# Patient Record
Sex: MALE | Race: WHITE | NOT HISPANIC OR LATINO | Employment: OTHER | URBAN - METROPOLITAN AREA
[De-identification: names, ages, dates, MRNs, and addresses within clinical notes are randomized per-mention and may not be internally consistent; named-entity substitution may affect disease eponyms.]

---

## 2022-09-02 DIAGNOSIS — G57.92 ISCHEMIC NEUROPATHY OF LEFT FOOT: Primary | ICD-10-CM

## 2022-09-02 RX ORDER — GABAPENTIN 800 MG/1
TABLET ORAL
Qty: 120 TABLET | Refills: 5 | Status: SHIPPED | OUTPATIENT
Start: 2022-09-02 | End: 2023-03-06 | Stop reason: SDUPTHER

## 2022-09-02 NOTE — TELEPHONE ENCOUNTER
Rx Refill Note  Requested Prescriptions     Pending Prescriptions Disp Refills   • gabapentin (NEURONTIN) 800 MG tablet [Pharmacy Med Name: GABAPENTIN 800 MG TABLET] 120 tablet 0     Sig: TAKE ONE TABLET BY MOUTH FOUR TIMES A DAY      Last office visit with prescribing clinician: Visit date not found      Next office visit with prescribing clinician: 9/20/2022            Karuna Calzada MA  09/02/22, 15:01 EDT

## 2022-09-20 ENCOUNTER — OFFICE VISIT (OUTPATIENT)
Dept: FAMILY MEDICINE CLINIC | Facility: CLINIC | Age: 57
End: 2022-09-20

## 2022-09-20 VITALS
WEIGHT: 240 LBS | DIASTOLIC BLOOD PRESSURE: 70 MMHG | SYSTOLIC BLOOD PRESSURE: 134 MMHG | HEIGHT: 69 IN | BODY MASS INDEX: 35.55 KG/M2 | TEMPERATURE: 98.2 F | HEART RATE: 74 BPM | RESPIRATION RATE: 18 BRPM

## 2022-09-20 DIAGNOSIS — F17.218 CIGARETTE NICOTINE DEPENDENCE WITH OTHER NICOTINE-INDUCED DISORDER: ICD-10-CM

## 2022-09-20 DIAGNOSIS — G57.92 ISCHEMIC NEUROPATHY OF FOOT, LEFT: Primary | ICD-10-CM

## 2022-09-20 DIAGNOSIS — Z12.2 SCREENING FOR LUNG CANCER: ICD-10-CM

## 2022-09-20 DIAGNOSIS — Z12.11 COLON CANCER SCREENING: ICD-10-CM

## 2022-09-20 PROBLEM — I25.10 CORONARY ARTERY DISEASE INVOLVING NATIVE CORONARY ARTERY OF NATIVE HEART WITHOUT ANGINA PECTORIS: Status: ACTIVE | Noted: 2022-09-20

## 2022-09-20 PROBLEM — I25.5 ISCHEMIC CARDIOMYOPATHY: Status: ACTIVE | Noted: 2022-09-20

## 2022-09-20 PROCEDURE — 99213 OFFICE O/P EST LOW 20 MIN: CPT | Performed by: FAMILY MEDICINE

## 2022-09-20 RX ORDER — SIMVASTATIN 40 MG
TABLET ORAL
COMMUNITY
Start: 2022-09-02

## 2022-09-20 RX ORDER — CARVEDILOL 12.5 MG/1
12.5 TABLET ORAL 2 TIMES DAILY WITH MEALS
COMMUNITY
Start: 2022-09-02

## 2022-09-20 RX ORDER — RIVAROXABAN 2.5 MG/1
TABLET, FILM COATED ORAL
COMMUNITY
Start: 2022-09-02 | End: 2022-09-20 | Stop reason: SINTOL

## 2022-09-20 RX ORDER — ASPIRIN 81 MG/1
81 TABLET ORAL DAILY
COMMUNITY

## 2022-09-20 RX ORDER — CLOPIDOGREL BISULFATE 75 MG/1
75 TABLET ORAL DAILY
COMMUNITY
Start: 2022-09-02

## 2022-09-20 RX ORDER — SACUBITRIL AND VALSARTAN 49; 51 MG/1; MG/1
1 TABLET, FILM COATED ORAL 2 TIMES DAILY
COMMUNITY
Start: 2022-09-02

## 2022-09-20 NOTE — PROGRESS NOTES
"Chief Complaint   Patient presents with   • Establish Care   • Hyperlipidemia     Needs medication refilled        Subjective      Elton Pardo is a 57 y.o. who presents for ischemic neuropathy of the left foot.  Patient has long history of vascular disease and neuropathic pain is responded well to gabapentin over the years.  Patient follows with cardiology for his ischemic cardiomyopathy and coronary artery disease.  He is due for cardiology follow-up.  Overall he denies changes in health.  He continues to smoke 2 packs of cigarettes per day    The following portions of the patient's history were reviewed and updated as appropriate: allergies, current medications, past family history, past medical history, past social history, past surgical history and problem list.    Review of Systems    Objective   Vital Signs:  /70   Pulse 74   Temp 98.2 °F (36.8 °C)   Resp 18   Ht 175.3 cm (69\")   Wt 109 kg (240 lb)   BMI 35.44 kg/m²         Physical Exam  Constitutional:       Appearance: Normal appearance.   Cardiovascular:      Rate and Rhythm: Normal rate and regular rhythm.      Pulses: Normal pulses.      Heart sounds: Normal heart sounds.   Pulmonary:      Effort: Pulmonary effort is normal.      Breath sounds: Normal breath sounds.   Skin:     General: Skin is warm.   Neurological:      Mental Status: He is alert.          Result Review                     Assessment and Plan  Diagnoses and all orders for this visit:    1. Ischemic neuropathy of foot, left (Primary)    2. Colon cancer screening  -     Ambulatory Referral For Screening Colonoscopy    3. Cigarette nicotine dependence with other nicotine-induced disorder  -      CT Chest Low Dose Cancer Screening WO; Future    4. Screening for lung cancer  -      CT Chest Low Dose Cancer Screening WO; Future    5. BMI 35.0-35.9,adult    MDM:  1Zana Basilio reviewed and compliant.  Patient has been given a refill for gabapentin earlier this month and will " continue this.  Follow-up again in 6 months for annual physical  2.  Patient is overdue for colon cancer screening and believes he had 3 polyps removed approximately 10 years ago during last colonoscopy.  Patient will be referred to Dr. Delbert Gunn at [facility].  3.  Patient has a 90+ pack year history of smoking.  Patient was informed of his candidacy for low-dose CT scan of the chest for lung cancer screening.  Patient is agreeable to this and this will also be arranged at [facility] due to patient proximity.        Follow Up  Return in about 6 months (around 3/20/2023) for Annual.  Patient was given instructions and counseling regarding his condition or for health maintenance advice. Please see specific information pulled into the AVS if appropriate.

## 2022-10-14 ENCOUNTER — HOSPITAL ENCOUNTER (OUTPATIENT)
Age: 57
End: 2022-10-14
Payer: COMMERCIAL

## 2022-10-14 DIAGNOSIS — Z87.891: Primary | ICD-10-CM

## 2022-10-14 DIAGNOSIS — Z12.2: ICD-10-CM

## 2022-10-14 PROCEDURE — 71271 CT THORAX LUNG CANCER SCR C-: CPT

## 2022-12-06 ENCOUNTER — HOSPITAL ENCOUNTER (OUTPATIENT)
Age: 57
Discharge: HOME | End: 2022-12-06
Payer: COMMERCIAL

## 2022-12-06 VITALS
HEART RATE: 72 BPM | OXYGEN SATURATION: 99 % | RESPIRATION RATE: 16 BRPM | DIASTOLIC BLOOD PRESSURE: 85 MMHG | SYSTOLIC BLOOD PRESSURE: 135 MMHG

## 2022-12-06 VITALS
SYSTOLIC BLOOD PRESSURE: 126 MMHG | HEART RATE: 70 BPM | OXYGEN SATURATION: 99 % | DIASTOLIC BLOOD PRESSURE: 84 MMHG | RESPIRATION RATE: 16 BRPM | TEMPERATURE: 97.6 F

## 2022-12-06 VITALS — BODY MASS INDEX: 21.4 KG/M2

## 2022-12-06 VITALS
HEART RATE: 75 BPM | DIASTOLIC BLOOD PRESSURE: 83 MMHG | RESPIRATION RATE: 17 BRPM | SYSTOLIC BLOOD PRESSURE: 166 MMHG | OXYGEN SATURATION: 97 % | TEMPERATURE: 97.8 F

## 2022-12-06 VITALS
RESPIRATION RATE: 20 BRPM | DIASTOLIC BLOOD PRESSURE: 82 MMHG | HEART RATE: 78 BPM | TEMPERATURE: 97.5 F | SYSTOLIC BLOOD PRESSURE: 136 MMHG | OXYGEN SATURATION: 91 %

## 2022-12-06 VITALS
HEART RATE: 71 BPM | RESPIRATION RATE: 18 BRPM | DIASTOLIC BLOOD PRESSURE: 44 MMHG | SYSTOLIC BLOOD PRESSURE: 120 MMHG | OXYGEN SATURATION: 98 %

## 2022-12-06 VITALS — OXYGEN SATURATION: 97 %

## 2022-12-06 DIAGNOSIS — Z12.11: Primary | ICD-10-CM

## 2022-12-06 DIAGNOSIS — D12.5: ICD-10-CM

## 2022-12-06 PROCEDURE — 45380 COLONOSCOPY AND BIOPSY: CPT

## 2022-12-06 PROCEDURE — 0DJD8ZZ INSPECTION OF LOWER INTESTINAL TRACT, VIA NATURAL OR ARTIFICIAL OPENING ENDOSCOPIC: ICD-10-PCS | Performed by: SURGERY

## 2022-12-06 PROCEDURE — 45385 COLONOSCOPY W/LESION REMOVAL: CPT

## 2023-03-06 DIAGNOSIS — G57.92 ISCHEMIC NEUROPATHY OF LEFT FOOT: ICD-10-CM

## 2023-03-06 RX ORDER — GABAPENTIN 800 MG/1
800 TABLET ORAL 4 TIMES DAILY
Qty: 120 TABLET | Refills: 5 | Status: SHIPPED | OUTPATIENT
Start: 2023-03-06

## 2023-03-06 NOTE — TELEPHONE ENCOUNTER
Incoming Refill Request      Medication requested (name and dose): gabapentin (NEURONTIN) 800 MG tablet    Pharmacy where request should be sent: [facility]    Additional details provided by patient: PT IS OUT OF MEDICATION AND HAS AN UPCOMING APPT ON 03/20    Best call back number:ON FILE    Does the patient have less than a 3 day supply:  [x] Yes  [] No    Amparo Long  03/06/23, 09:00 EST

## 2023-03-20 ENCOUNTER — OFFICE VISIT (OUTPATIENT)
Dept: FAMILY MEDICINE CLINIC | Facility: CLINIC | Age: 58
End: 2023-03-20
Payer: COMMERCIAL

## 2023-03-20 VITALS
SYSTOLIC BLOOD PRESSURE: 150 MMHG | OXYGEN SATURATION: 97 % | DIASTOLIC BLOOD PRESSURE: 80 MMHG | WEIGHT: 242.8 LBS | HEIGHT: 69 IN | TEMPERATURE: 97.3 F | BODY MASS INDEX: 35.96 KG/M2 | RESPIRATION RATE: 20 BRPM | HEART RATE: 70 BPM

## 2023-03-20 DIAGNOSIS — G57.92 ISCHEMIC NEUROPATHY OF FOOT, LEFT: Primary | ICD-10-CM

## 2023-03-20 DIAGNOSIS — L30.1 DYSHIDROTIC ECZEMA: ICD-10-CM

## 2023-03-20 PROCEDURE — 96372 THER/PROPH/DIAG INJ SC/IM: CPT | Performed by: FAMILY MEDICINE

## 2023-03-20 PROCEDURE — 99213 OFFICE O/P EST LOW 20 MIN: CPT | Performed by: FAMILY MEDICINE

## 2023-03-20 RX ORDER — TRIAMCINOLONE ACETONIDE 40 MG/ML
40 INJECTION, SUSPENSION INTRA-ARTICULAR; INTRAMUSCULAR ONCE
Status: COMPLETED | OUTPATIENT
Start: 2023-03-20 | End: 2023-03-20

## 2023-03-20 RX ORDER — PREDNISONE 20 MG/1
TABLET ORAL
Qty: 15 TABLET | Refills: 0 | Status: SHIPPED | OUTPATIENT
Start: 2023-03-20

## 2023-03-20 RX ADMIN — TRIAMCINOLONE ACETONIDE 40 MG: 40 INJECTION, SUSPENSION INTRA-ARTICULAR; INTRAMUSCULAR at 15:40

## 2023-03-20 NOTE — PROGRESS NOTES
"Chief Complaint   Patient presents with   • Follow-up   • Coronary Artery Disease   • ischemic neuropathy L foot        Subjective      Elton Pardo is a 57 y.o. who presents for maintenance visit of his ischemic neuropathy of the left foot.  He reports pain remains well controlled with gabapentin 800 mg 4 times daily.  He is not in need of a refill today.    Patient has a new complaint of pruritic rash that develops on the hands.  Rash develops in colder weather.  Rash has been happening over the lifespan.  It is responded best to steroids    Objective   Vital Signs:  /80   Pulse 70   Temp 97.3 °F (36.3 °C)   Resp 20   Ht 175.3 cm (69.02\")   Wt 110 kg (242 lb 12.8 oz)   SpO2 97%   BMI 35.84 kg/m²     Physical Exam  Vitals reviewed.   Constitutional:       Appearance: Normal appearance.   Cardiovascular:      Rate and Rhythm: Normal rate and regular rhythm.      Pulses: Normal pulses.      Heart sounds: Normal heart sounds.   Pulmonary:      Effort: Pulmonary effort is normal.      Breath sounds: Normal breath sounds.   Skin:     Comments: Erythematous patches on the palms of the hands and on the right middle finger   Neurological:      Mental Status: He is alert.          Result Review                     Assessment and Plan  Diagnoses and all orders for this visit:    1. Ischemic neuropathy of foot, left (Primary)  Comments:  Stable.  Continue gabapentin.  RTO 6 months    2. Dyshidrotic eczema  Comments:  Given IM triamcinolone.  Complete 10-day prednisone taper.  Continue moisturizer  Orders:  -     triamcinolone acetonide (KENALOG-40) injection 40 mg  -     predniSONE (DELTASONE) 20 MG tablet; 2 tablets daily for 5 days then 1 tablet daily for 5 days  Dispense: 15 tablet; Refill: 0            Follow Up  Return in about 6 months (around 9/20/2023) for Annual.  Patient was given instructions and counseling regarding his condition or for health maintenance advice. Please see specific information " pulled into the AVS if appropriate.

## 2023-09-06 DIAGNOSIS — G57.92 ISCHEMIC NEUROPATHY OF LEFT FOOT: ICD-10-CM

## 2023-09-06 RX ORDER — GABAPENTIN 800 MG/1
TABLET ORAL
Qty: 120 TABLET | Refills: 5 | Status: SHIPPED | OUTPATIENT
Start: 2023-09-06

## 2023-09-06 NOTE — TELEPHONE ENCOUNTER
Rx Refill Note  Requested Prescriptions     Pending Prescriptions Disp Refills    gabapentin (NEURONTIN) 800 MG tablet [Pharmacy Med Name: GABAPENTIN 800 MG TABLET] 120 tablet 0     Sig: TAKE ONE TABLET BY MOUTH FOUR TIMESA DAY      Last office visit with prescribing clinician: 3/20/2023   Last telemedicine visit with prescribing clinician: Visit date not found   Next office visit with prescribing clinician: 9/22/2023                         Would you like a call back once the refill request has been completed: [] Yes [] No    If the office needs to give you a call back, can they leave a voicemail: [] Yes [] No    Karuna Calzada MA  09/06/23, 13:46 EDT

## 2023-09-22 ENCOUNTER — OFFICE VISIT (OUTPATIENT)
Dept: FAMILY MEDICINE CLINIC | Facility: CLINIC | Age: 58
End: 2023-09-22
Payer: COMMERCIAL

## 2023-09-22 VITALS
HEIGHT: 69 IN | TEMPERATURE: 97.8 F | RESPIRATION RATE: 16 BRPM | HEART RATE: 74 BPM | BODY MASS INDEX: 33.95 KG/M2 | DIASTOLIC BLOOD PRESSURE: 88 MMHG | WEIGHT: 229.2 LBS | SYSTOLIC BLOOD PRESSURE: 140 MMHG

## 2023-09-22 DIAGNOSIS — G57.92 ISCHEMIC NEUROPATHY OF FOOT, LEFT: ICD-10-CM

## 2023-09-22 DIAGNOSIS — Z00.00 ANNUAL PHYSICAL EXAM: Primary | ICD-10-CM

## 2023-09-22 DIAGNOSIS — F17.218 CIGARETTE NICOTINE DEPENDENCE WITH OTHER NICOTINE-INDUCED DISORDER: ICD-10-CM

## 2023-09-22 DIAGNOSIS — G57.92 ISCHEMIC NEUROPATHY OF LEFT FOOT: ICD-10-CM

## 2023-09-22 DIAGNOSIS — I25.10 CORONARY ARTERY DISEASE INVOLVING NATIVE CORONARY ARTERY OF NATIVE HEART WITHOUT ANGINA PECTORIS: ICD-10-CM

## 2023-09-22 DIAGNOSIS — Z12.2 SCREENING FOR LUNG CANCER: ICD-10-CM

## 2023-09-22 DIAGNOSIS — Z23 NEED FOR INFLUENZA VACCINATION: ICD-10-CM

## 2023-09-22 RX ORDER — GABAPENTIN 800 MG/1
800 TABLET ORAL 4 TIMES DAILY
Qty: 120 TABLET | Refills: 5
Start: 2023-09-22

## 2023-09-22 NOTE — PROGRESS NOTES
"Chief Complaint   Patient presents with    Annual Exam    Flu Vaccine       Subjective      Elton Pardo is a 58 y.o. who presents for annual physical. He denies changes in health.  He has cardiology follow-up next week for his ischemic cardiomyopathy.  He feels like he is doing well.  He has lost weight and continues to increase his activity level which is occasionally limited by the ischemic neuropathy in the left leg.    The following portions of the patient's history were reviewed and updated as appropriate: allergies, current medications, past family history, past medical history, past social history, past surgical history, and problem list.    Review of Systems   All other systems reviewed and are negative.    Objective   Vital Signs:  /88   Pulse 74   Temp 97.8 °F (36.6 °C)   Resp 16   Ht 175.3 cm (69.02\")   Wt 104 kg (229 lb 3.2 oz)   BMI 33.83 kg/m²     BMI is >= 30 and <35. (Class 1 Obesity). The following options were offered after discussion;: exercise counseling/recommendations and nutrition counseling/recommendations        Physical Exam  Constitutional:       General: He is not in acute distress.     Appearance: Normal appearance. He is not ill-appearing.   HENT:      Head: Normocephalic and atraumatic.      Right Ear: Tympanic membrane and ear canal normal.      Left Ear: Tympanic membrane and ear canal normal.      Nose: Nose normal.      Mouth/Throat:      Mouth: Mucous membranes are dry.      Pharynx: Oropharynx is clear. No posterior oropharyngeal erythema.   Eyes:      Extraocular Movements: Extraocular movements intact.      Conjunctiva/sclera: Conjunctivae normal.      Pupils: Pupils are equal, round, and reactive to light.   Cardiovascular:      Rate and Rhythm: Normal rate and regular rhythm.      Pulses: Normal pulses.      Heart sounds: Normal heart sounds.   Pulmonary:      Effort: Pulmonary effort is normal. No respiratory distress.      Breath sounds: Normal breath sounds. "   Abdominal:      General: Abdomen is flat. Bowel sounds are normal.      Palpations: Abdomen is soft.      Tenderness: There is no abdominal tenderness.   Musculoskeletal:         General: Normal range of motion.      Cervical back: Normal range of motion and neck supple.   Lymphadenopathy:      Cervical: No cervical adenopathy.   Skin:     General: Skin is warm and dry.      Capillary Refill: Capillary refill takes less than 2 seconds.   Neurological:      General: No focal deficit present.      Mental Status: He is alert and oriented to person, place, and time. Mental status is at baseline.      Cranial Nerves: No cranial nerve deficit.      Sensory: No sensory deficit.      Motor: No weakness.   Psychiatric:         Mood and Affect: Mood normal.         Behavior: Behavior normal.         Thought Content: Thought content normal.         Judgment: Judgment normal.        Result Review                     Assessment and Plan  Diagnoses and all orders for this visit:    1. Annual physical exam (Primary)    2. Need for influenza vaccination  -     Fluzone >6 Months ([id number])    3. Ischemic neuropathy of foot, left    4. Ischemic neuropathy of left foot  -     gabapentin (NEURONTIN) 800 MG tablet; Take 1 tablet by mouth 4 (Four) Times a Day.  Dispense: 120 tablet; Refill: 5    5. Coronary artery disease involving native coronary artery of native heart without angina pectoris  -     CBC & Differential  -     Comprehensive Metabolic Panel  -     Lipid Panel    6. Cigarette nicotine dependence with other nicotine-induced disorder  -      CT Chest Low Dose Cancer Screening WO; Future    7. Screening for lung cancer  -      CT Chest Low Dose Cancer Screening WO; Future    Plan  1.  Congratulated patient on the positive lifestyle changes he has made and encouraged him to continue to increase his activity level.  2.  Recommended smoking cessation.  Patient is not ready to stop although recognizes the harms of continued  cigarette smoking  3.  Low-dose CT scan of the chest will be arranged for lung cancer screening  4.  Surveillance labs ordered today  5.  Flu shot given today.  Recommend patient also get shingles vaccination        Follow Up  No follow-ups on file.  Patient was given instructions and counseling regarding his condition or for health maintenance advice. Please see specific information pulled into the AVS if appropriate.

## 2023-09-23 LAB
ALBUMIN SERPL-MCNC: 4.3 G/DL (ref 3.8–4.9)
ALBUMIN/GLOB SERPL: 1.7 {RATIO} (ref 1.2–2.2)
ALP SERPL-CCNC: 70 IU/L (ref 44–121)
ALT SERPL-CCNC: 8 IU/L (ref 0–44)
AST SERPL-CCNC: 11 IU/L (ref 0–40)
BASOPHILS # BLD AUTO: 0.1 X10E3/UL (ref 0–0.2)
BASOPHILS NFR BLD AUTO: 1 %
BILIRUB SERPL-MCNC: 0.7 MG/DL (ref 0–1.2)
BUN SERPL-MCNC: 6 MG/DL (ref 6–24)
BUN/CREAT SERPL: 9 (ref 9–20)
CALCIUM SERPL-MCNC: 9.4 MG/DL (ref 8.7–10.2)
CHLORIDE SERPL-SCNC: 99 MMOL/L (ref 96–106)
CHOLEST SERPL-MCNC: 229 MG/DL (ref 100–199)
CO2 SERPL-SCNC: 25 MMOL/L (ref 20–29)
CREAT SERPL-MCNC: 0.68 MG/DL (ref 0.76–1.27)
EGFRCR SERPLBLD CKD-EPI 2021: 108 ML/MIN/1.73
EOSINOPHIL # BLD AUTO: 0.2 X10E3/UL (ref 0–0.4)
EOSINOPHIL NFR BLD AUTO: 3 %
ERYTHROCYTE [DISTWIDTH] IN BLOOD BY AUTOMATED COUNT: 13.4 % (ref 11.6–15.4)
GLOBULIN SER CALC-MCNC: 2.5 G/DL (ref 1.5–4.5)
GLUCOSE SERPL-MCNC: 94 MG/DL (ref 70–99)
HCT VFR BLD AUTO: 46.8 % (ref 37.5–51)
HDLC SERPL-MCNC: 29 MG/DL
HGB BLD-MCNC: 16.3 G/DL (ref 13–17.7)
IMM GRANULOCYTES # BLD AUTO: 0.1 X10E3/UL (ref 0–0.1)
IMM GRANULOCYTES NFR BLD AUTO: 1 %
LDLC SERPL CALC-MCNC: 143 MG/DL (ref 0–99)
LYMPHOCYTES # BLD AUTO: 2.1 X10E3/UL (ref 0.7–3.1)
LYMPHOCYTES NFR BLD AUTO: 22 %
MCH RBC QN AUTO: 34.8 PG (ref 26.6–33)
MCHC RBC AUTO-ENTMCNC: 34.8 G/DL (ref 31.5–35.7)
MCV RBC AUTO: 100 FL (ref 79–97)
MONOCYTES # BLD AUTO: 0.6 X10E3/UL (ref 0.1–0.9)
MONOCYTES NFR BLD AUTO: 6 %
NEUTROPHILS # BLD AUTO: 6.6 X10E3/UL (ref 1.4–7)
NEUTROPHILS NFR BLD AUTO: 67 %
PLATELET # BLD AUTO: 254 X10E3/UL (ref 150–450)
POTASSIUM SERPL-SCNC: 4.5 MMOL/L (ref 3.5–5.2)
PROT SERPL-MCNC: 6.8 G/DL (ref 6–8.5)
RBC # BLD AUTO: 4.69 X10E6/UL (ref 4.14–5.8)
SODIUM SERPL-SCNC: 138 MMOL/L (ref 134–144)
TRIGL SERPL-MCNC: 308 MG/DL (ref 0–149)
VLDLC SERPL CALC-MCNC: 57 MG/DL (ref 5–40)
WBC # BLD AUTO: 9.7 X10E3/UL (ref 3.4–10.8)

## 2023-09-25 DIAGNOSIS — I25.10 CORONARY ARTERY DISEASE INVOLVING NATIVE CORONARY ARTERY OF NATIVE HEART WITHOUT ANGINA PECTORIS: Primary | ICD-10-CM

## 2023-09-25 RX ORDER — ROSUVASTATIN CALCIUM 40 MG/1
40 TABLET, COATED ORAL DAILY
Qty: 90 TABLET | Refills: 1 | Status: SHIPPED | OUTPATIENT
Start: 2023-09-25

## 2023-10-13 ENCOUNTER — HOSPITAL ENCOUNTER (OUTPATIENT)
Age: 58
End: 2023-10-13
Payer: COMMERCIAL

## 2023-10-13 DIAGNOSIS — F17.218 CIGARETTE NICOTINE DEPENDENCE WITH OTHER NICOTINE-INDUCED DISORDER: ICD-10-CM

## 2023-10-13 DIAGNOSIS — F17.218: ICD-10-CM

## 2023-10-13 DIAGNOSIS — Z12.2: Primary | ICD-10-CM

## 2023-10-13 DIAGNOSIS — Z12.2 SCREENING FOR LUNG CANCER: ICD-10-CM

## 2023-10-13 PROCEDURE — 71271 CT THORAX LUNG CANCER SCR C-: CPT

## 2024-03-05 DIAGNOSIS — G57.92 ISCHEMIC NEUROPATHY OF LEFT FOOT: ICD-10-CM

## 2024-03-05 RX ORDER — GABAPENTIN 800 MG/1
800 TABLET ORAL 4 TIMES DAILY
Qty: 120 TABLET | Refills: 5 | Status: SHIPPED | OUTPATIENT
Start: 2024-03-05

## 2024-03-05 NOTE — TELEPHONE ENCOUNTER
Caller: Elton Pardo    Relationship: Self    Best call back number: [phone]    Requested Prescriptions:   Requested Prescriptions     Pending Prescriptions Disp Refills    gabapentin (NEURONTIN) 800 MG tablet       Sig: Take 1 tablet by mouth 4 (Four) Times a Day.        Pharmacy where request should be sent:  [facility] - [address] - [phone]  - [phone]     Last office visit with prescribing clinician: 9/22/2023   Last telemedicine visit with prescribing clinician: Visit date not found   Next office visit with prescribing clinician: Visit date not found     Additional details provided by patient:     Does the patient have less than a 3 day supply:  [x] Yes  [] No    Would you like a call back once the refill request has been completed: [x] Yes [] No    If the office needs to give you a call back, can they leave a voicemail: [x] Yes [] No    Zeynep Rolle Rep   03/05/24 12:48 EST

## 2024-08-29 DIAGNOSIS — G57.92 ISCHEMIC NEUROPATHY OF LEFT FOOT: ICD-10-CM

## 2024-08-29 RX ORDER — GABAPENTIN 800 MG/1
800 TABLET ORAL 4 TIMES DAILY
Qty: 120 TABLET | Refills: 0 | Status: SHIPPED | OUTPATIENT
Start: 2024-08-29

## 2024-08-29 NOTE — TELEPHONE ENCOUNTER
Caller: Elton Pardo    Relationship: Self    Best call back number: [phone]     Requested Prescriptions:   Requested Prescriptions     Pending Prescriptions Disp Refills    gabapentin (NEURONTIN) 800 MG tablet 120 tablet 5     Sig: Take 1 tablet by mouth 4 (Four) Times a Day.        Pharmacy where request should be sent: [facility] - [address] - [phone] PH - [phone] FX     Last office visit with prescribing clinician: 9/22/2023   Last telemedicine visit with prescribing clinician: Visit date not found   Next office visit with prescribing clinician: Visit date not found     Additional details provided by patient:     Does the patient have less than a 3 day supply:  [] Yes  [x] No    Would you like a call back once the refill request has been completed: [] Yes [x] No    If the office needs to give you a call back, can they leave a voicemail: [] Yes [x] No    Zeynep Mason Rep   08/29/24 14:01 EDT

## 2024-10-11 DIAGNOSIS — G57.92 ISCHEMIC NEUROPATHY OF LEFT FOOT: ICD-10-CM

## 2024-10-11 RX ORDER — GABAPENTIN 800 MG/1
TABLET ORAL
Qty: 120 TABLET | Refills: 0 | Status: SHIPPED | OUTPATIENT
Start: 2024-10-11

## 2024-10-11 NOTE — TELEPHONE ENCOUNTER
A 1 month prescription has been sent to the patient's pharmacy.  He is overdue for an appointment and will need to be seen for additional refills

## 2024-10-22 ENCOUNTER — OFFICE VISIT (OUTPATIENT)
Dept: FAMILY MEDICINE CLINIC | Facility: CLINIC | Age: 59
End: 2024-10-22
Payer: COMMERCIAL

## 2024-10-22 VITALS
SYSTOLIC BLOOD PRESSURE: 134 MMHG | WEIGHT: 220 LBS | DIASTOLIC BLOOD PRESSURE: 76 MMHG | HEART RATE: 72 BPM | OXYGEN SATURATION: 97 % | RESPIRATION RATE: 18 BRPM | HEIGHT: 69 IN | BODY MASS INDEX: 32.58 KG/M2 | TEMPERATURE: 97.8 F

## 2024-10-22 DIAGNOSIS — Z00.00 ANNUAL PHYSICAL EXAM: Primary | ICD-10-CM

## 2024-10-22 DIAGNOSIS — G57.92 ISCHEMIC NEUROPATHY OF LEFT FOOT: ICD-10-CM

## 2024-10-22 DIAGNOSIS — J44.9 CHRONIC OBSTRUCTIVE PULMONARY DISEASE, UNSPECIFIED COPD TYPE: ICD-10-CM

## 2024-10-22 DIAGNOSIS — Z12.2 ENCOUNTER FOR SCREENING FOR LUNG CANCER: ICD-10-CM

## 2024-10-22 DIAGNOSIS — Z87.891 PERSONAL HISTORY OF NICOTINE DEPENDENCE: ICD-10-CM

## 2024-10-22 PROBLEM — E66.811 OBESITY (BMI 30.0-34.9): Status: ACTIVE | Noted: 2024-10-22

## 2024-10-22 PROCEDURE — 1159F MED LIST DOCD IN RCRD: CPT | Performed by: FAMILY MEDICINE

## 2024-10-22 PROCEDURE — 1160F RVW MEDS BY RX/DR IN RCRD: CPT | Performed by: FAMILY MEDICINE

## 2024-10-22 PROCEDURE — 90656 IIV3 VACC NO PRSV 0.5 ML IM: CPT | Performed by: FAMILY MEDICINE

## 2024-10-22 PROCEDURE — 99396 PREV VISIT EST AGE 40-64: CPT | Performed by: FAMILY MEDICINE

## 2024-10-22 PROCEDURE — 90471 IMMUNIZATION ADMIN: CPT | Performed by: FAMILY MEDICINE

## 2024-10-22 RX ORDER — GABAPENTIN 800 MG/1
800 TABLET ORAL 4 TIMES DAILY
Qty: 120 TABLET | Refills: 5 | Status: SHIPPED | OUTPATIENT
Start: 2024-10-22

## 2024-10-22 RX ORDER — SIMVASTATIN 40 MG
40 TABLET ORAL DAILY
COMMUNITY
Start: 2024-05-06

## 2024-10-22 RX ORDER — UMECLIDINIUM BROMIDE AND VILANTEROL TRIFENATATE 62.5; 25 UG/1; UG/1
1 POWDER RESPIRATORY (INHALATION)
Qty: 60 EACH | Refills: 11 | Status: SHIPPED | OUTPATIENT
Start: 2024-10-22

## 2024-10-22 NOTE — PROGRESS NOTES
"Chief Complaint   Patient presents with    Med Refill       Subjective      Elton Pardo is a 59 y.o. who presents for yearly physical.    Sleep.  6 to 8 hours per night.    Diet.  Patient limits sodium and has tried to limit red meat in the diet.    Physical activity.  Mostly through ADLs.  No formal exercise.    Patient continues to smoke and is actually smoking more cigarettes now than he was a year ago after the death of a family member.    The following portions of the patient's history were reviewed and updated as appropriate: allergies, current medications, past family history, past medical history, past social history, past surgical history, and problem list.    Review of Systems   Respiratory:  Positive for cough and wheezing.    All other systems reviewed and are negative.      Objective   Vital Signs:  /76   Pulse 72   Temp 97.8 °F (36.6 °C)   Resp 18   Ht 175.3 cm (69\")   Wt 99.8 kg (220 lb)   SpO2 97%   BMI 32.49 kg/m²     BMI is >= 30 and <35. (Class 1 Obesity). The following options were offered after discussion;: Information on healthy weight added to patient's after visit summary.        Physical Exam  Constitutional:       General: He is not in acute distress.     Appearance: Normal appearance. He is not ill-appearing.   HENT:      Head: Normocephalic and atraumatic.      Right Ear: Tympanic membrane and ear canal normal.      Left Ear: Tympanic membrane and ear canal normal.      Nose: Nose normal.      Mouth/Throat:      Mouth: Mucous membranes are moist.      Pharynx: Oropharynx is clear. No posterior oropharyngeal erythema.   Eyes:      Extraocular Movements: Extraocular movements intact.      Conjunctiva/sclera: Conjunctivae normal.      Pupils: Pupils are equal, round, and reactive to light.   Cardiovascular:      Rate and Rhythm: Normal rate and regular rhythm.      Pulses: Normal pulses.      Heart sounds: Normal heart sounds.   Pulmonary:      Effort: Pulmonary effort is " normal. No respiratory distress.      Breath sounds: Wheezing present.   Abdominal:      General: Abdomen is flat. Bowel sounds are normal.      Palpations: Abdomen is soft.      Tenderness: There is no abdominal tenderness.   Musculoskeletal:         General: Normal range of motion.      Cervical back: Normal range of motion and neck supple.   Lymphadenopathy:      Cervical: No cervical adenopathy.   Skin:     General: Skin is warm and dry.      Capillary Refill: Capillary refill takes less than 2 seconds.   Neurological:      General: No focal deficit present.      Mental Status: He is alert and oriented to person, place, and time. Mental status is at baseline.      Cranial Nerves: No cranial nerve deficit.      Sensory: No sensory deficit.      Motor: No weakness.   Psychiatric:         Mood and Affect: Mood normal.         Behavior: Behavior normal.         Thought Content: Thought content normal.         Judgment: Judgment normal.          Result Review                     Assessment and Plan  Diagnoses and all orders for this visit:    1. Annual physical exam (Primary)    2. Ischemic neuropathy of left foot  -     gabapentin (NEURONTIN) 800 MG tablet; Take 1 tablet by mouth 4 (Four) Times a Day.  Dispense: 120 tablet; Refill: 5    3. Chronic obstructive pulmonary disease, unspecified COPD type  -     Umeclidinium-Vilanterol (Anoro Ellipta) 62.5-25 MCG/ACT aerosol powder  inhaler; Inhale 1 puff Daily.  Dispense: 60 each; Refill: 11    4. Encounter for screening for lung cancer    5. Personal history of nicotine dependence    Other orders  -     Fluzone >6mos ([id number])    Plan  1.  Patient's physical health is stable.  He is aware of the benefits of smoking cessation and will continue to try to reduce cigarette usage.  He declines nicotine replacement therapies.  2.  Flu vaccine given today  3.  CT scan of the chest will be arranged for lung cancer screening  4.  Patient may be due for colorectal cancer  screening.  We will obtain pathology reports from his last colonoscopy in 2022.  If due he will be referred to Dr. Brady Rose at [facility]  5.  Patient has findings consistent with COPD and after discussion of treatment options he will be started on Anoro.  6.  Patient will follow-up with his cardiologist Dr. Desir as scheduled next month.        Follow Up  Return in about 1 year (around 10/22/2025) for Annual.  Patient was given instructions and counseling regarding his condition or for health maintenance advice. Please see specific information pulled into the AVS if appropriate.

## 2025-04-03 ENCOUNTER — TELEPHONE (OUTPATIENT)
Dept: FAMILY MEDICINE CLINIC | Facility: CLINIC | Age: 60
End: 2025-04-03
Payer: COMMERCIAL

## 2025-04-03 NOTE — TELEPHONE ENCOUNTER
Patient's wife initially called and stated her  is having chest pain (initially described as it feels like something is sitting on his chest), shortness of breath, and coughing up blood. After speaking with patient directly, patient advised he is having all of these symptoms and stated they have been going on for about a month. Patient states he recently took an antibiotic for unrelated medical issue. Patient advised to go to ED and patient verbalized understanding. Patient denied any other symptoms.

## 2025-04-14 ENCOUNTER — OFFICE VISIT (OUTPATIENT)
Dept: FAMILY MEDICINE CLINIC | Facility: CLINIC | Age: 60
End: 2025-04-14
Payer: COMMERCIAL

## 2025-04-14 ENCOUNTER — HOSPITAL ENCOUNTER (OUTPATIENT)
Dept: GENERAL RADIOLOGY | Facility: HOSPITAL | Age: 60
Discharge: HOME | End: 2025-04-14
Admitting: FAMILY MEDICINE
Payer: COMMERCIAL

## 2025-04-14 VITALS
HEART RATE: 69 BPM | DIASTOLIC BLOOD PRESSURE: 70 MMHG | HEIGHT: 69 IN | SYSTOLIC BLOOD PRESSURE: 138 MMHG | TEMPERATURE: 98.2 F | OXYGEN SATURATION: 97 % | WEIGHT: 220.8 LBS | BODY MASS INDEX: 32.7 KG/M2 | RESPIRATION RATE: 20 BRPM

## 2025-04-14 DIAGNOSIS — J18.9 PNEUMONIA OF LEFT UPPER LOBE DUE TO INFECTIOUS ORGANISM: ICD-10-CM

## 2025-04-14 DIAGNOSIS — Z87.891 PERSONAL HISTORY OF NICOTINE DEPENDENCE: ICD-10-CM

## 2025-04-14 DIAGNOSIS — Z12.2 ENCOUNTER FOR SCREENING FOR LUNG CANCER: ICD-10-CM

## 2025-04-14 DIAGNOSIS — J18.9 PNEUMONIA OF LEFT UPPER LOBE DUE TO INFECTIOUS ORGANISM: Primary | ICD-10-CM

## 2025-04-14 PROCEDURE — 99214 OFFICE O/P EST MOD 30 MIN: CPT | Performed by: FAMILY MEDICINE

## 2025-04-14 PROCEDURE — 71046 X-RAY EXAM CHEST 2 VIEWS: CPT

## 2025-04-14 NOTE — PROGRESS NOTES
"Chief Complaint   Patient presents with    FU from [facility] ER / pneumonia        Subjective      Elton Pardo is a 59 y.o. who presents for follow-up of an emergency room visit from April 3 at [facility] when he presented with complaint of several months of sensation as if he was breathing through a sponge.  He had developed cough and even hemoptysis.  ER evaluation revealed mild leukocytosis along with abnormal chest x-ray showing a left upper lobe opacity and subsequent CTA of the chest showing the opacity along with lobulated appearing mass in the left perihilar region of unknown significance considered to possibly be malignancy versus group of lymph nodes.  Patient denies ever having fevers, chills, night sweats.  He is a longtime cigarette smoker.  Last low-dose CT scan of the chest was in October 2023 and there were no abnormalities in the left lung, only nodules on the right which were not suspicious.  Patient was treated with doxycycline and reports improvement in symptoms, specifically shortness of breath.  Cough is now absent.  He also has underlying COPD and tells me today that he cannot tolerate the Anoro any longer as it triggers significant cough with each use.    The following portions of the patient's history were reviewed and updated as appropriate: allergies, current medications, past family history, past medical history, past social history, past surgical history, and problem list.    Review of Systems    Objective   Vital Signs:  /70   Pulse 69   Temp 98.2 °F (36.8 °C)   Resp 20   Ht 175.3 cm (69\")   Wt 100 kg (220 lb 12.8 oz)   SpO2 97%   BMI 32.61 kg/m²               Physical Exam  Vitals reviewed.   Constitutional:       Appearance: Normal appearance.   HENT:      Head: Normocephalic and atraumatic.      Right Ear: Tympanic membrane and ear canal normal.      Left Ear: Tympanic membrane and ear canal normal.      Nose: Nose normal.      Mouth/Throat:      " Mouth: Mucous membranes are moist.      Pharynx: Oropharynx is clear.   Eyes:      Conjunctiva/sclera: Conjunctivae normal.   Cardiovascular:      Rate and Rhythm: Normal rate and regular rhythm.      Heart sounds: Normal heart sounds. No murmur heard.  Pulmonary:      Effort: Pulmonary effort is normal. No respiratory distress.      Breath sounds: Examination of the left-upper field reveals rhonchi. Examination of the left-middle field reveals rhonchi. Rhonchi present.   Musculoskeletal:      Cervical back: Normal range of motion and neck supple. No tenderness.   Lymphadenopathy:      Cervical: No cervical adenopathy.   Skin:     General: Skin is warm and dry.   Neurological:      Mental Status: He is alert.   Psychiatric:         Mood and Affect: Mood normal.          Result Review   The following data was reviewed by: Tobi Grijalva MD on 04/14/2025:    Data reviewed : Radiologic studies chest x-ray and CTA chest April 3, 2025 and ER note along with labs 4/3/2025               Assessment and Plan  Diagnoses and all orders for this visit:    1. Pneumonia of left upper lobe due to infectious organism (Primary)  -     XR Chest PA & Lateral; Future    2. Encounter for screening for lung cancer  -      CT Chest Low Dose Cancer Screening WO; Future    3. Personal history of nicotine dependence  -      CT Chest Low Dose Cancer Screening WO; Future    Plan: New problem with uncertain diagnosis prognosis.  Discussion was had with the patient and his wife regarding the workup.  I have expressed some optimism over the fact that he is feeling better after a course of antibiotics which would support a diagnosis of pneumonia as the cause of his symptoms.  Lung exam remains mildly abnormal.  He will have a repeat chest x-ray today to assess for improvement in left upper lobe opacity and left perihilar fullness.  We will proceed with repeat low-dose CT scan of the chest to be performed in early May as another form of  follow-up and for lung cancer screening.        Follow Up  No follow-ups on file.  Patient was given instructions and counseling regarding his condition or for health maintenance advice. Please see specific information pulled into the AVS if appropriate.

## 2025-05-01 DIAGNOSIS — Z12.2 ENCOUNTER FOR SCREENING FOR LUNG CANCER: ICD-10-CM

## 2025-05-01 DIAGNOSIS — G57.92 ISCHEMIC NEUROPATHY OF LEFT FOOT: ICD-10-CM

## 2025-05-01 DIAGNOSIS — Z87.891 PERSONAL HISTORY OF NICOTINE DEPENDENCE: ICD-10-CM

## 2025-05-01 RX ORDER — GABAPENTIN 800 MG/1
800 TABLET ORAL EVERY 6 HOURS
Qty: 120 TABLET | Refills: 5 | Status: SHIPPED | OUTPATIENT
Start: 2025-05-01

## 2025-05-08 ENCOUNTER — TELEPHONE (OUTPATIENT)
Dept: FAMILY MEDICINE CLINIC | Facility: CLINIC | Age: 60
End: 2025-05-08

## 2025-05-08 NOTE — TELEPHONE ENCOUNTER
Caller: DAVIN STONE    Relationship: Other    Best call back number: [phone]   CASE NUMBER: [id number]    What was the call regarding: [facility] STONE IS CALLING AND WOULD LIKE DR. CRAWFORD TO BE AWARE THAT THEY HAVE APPROVED THE PATIENTS ORDER FOR A CT SCAN OF THE CHEST WITH CONTRAST. THE AUTH NUMBER FOR THIS IS [id number].

## 2025-05-29 DIAGNOSIS — R91.8 MASS OF UPPER LOBE OF LEFT LUNG: ICD-10-CM

## 2025-06-10 ENCOUNTER — OFFICE VISIT (OUTPATIENT)
Dept: PULMONOLOGY | Facility: CLINIC | Age: 60
End: 2025-06-10
Payer: COMMERCIAL

## 2025-06-10 ENCOUNTER — PATIENT OUTREACH (OUTPATIENT)
Dept: ONCOLOGY | Facility: CLINIC | Age: 60
End: 2025-06-10
Payer: COMMERCIAL

## 2025-06-10 VITALS
DIASTOLIC BLOOD PRESSURE: 88 MMHG | HEART RATE: 86 BPM | HEIGHT: 69 IN | WEIGHT: 211 LBS | BODY MASS INDEX: 31.25 KG/M2 | OXYGEN SATURATION: 96 % | SYSTOLIC BLOOD PRESSURE: 168 MMHG

## 2025-06-10 DIAGNOSIS — R91.8 LUNG MASS: Primary | ICD-10-CM

## 2025-06-10 DIAGNOSIS — Z87.891 PERSONAL HISTORY OF SMOKING: ICD-10-CM

## 2025-06-10 RX ORDER — ALBUTEROL SULFATE 90 UG/1
4 INHALANT RESPIRATORY (INHALATION) ONCE
Status: COMPLETED | OUTPATIENT
Start: 2025-06-10 | End: 2025-06-10

## 2025-06-10 RX ADMIN — ALBUTEROL SULFATE 4 PUFF: 90 INHALANT RESPIRATORY (INHALATION) at 14:33

## 2025-06-10 NOTE — PROGRESS NOTES
New Patient Pulmonary Office Visit      Patient Name: Elton Pardo    Referring Physician: Tobi Grijalva, *    Chief Complaint:    Chief Complaint   Patient presents with    Lung Mass       History of Present Illness: Elton Pardo is a 60 y.o. male who is here today to establish care with Pulmonary.  Patient has a past medical history significant for coronary disease, obesity, and ischemic cardiomyopathy.  Patient was referred to pulmonary for evaluation of a pulmonary nodule.  Patient states that back in the winter he actually started having increased cough, felt like he tore something in his chest.  Had a CT scan of the chest showing a left upper lobe mass.  Cough gradually improved he still has some tightness feeling in the left chest but no other acute complaints.  States most of his issues are around cardiac issues she has had in the past.  No other acute complaints    Review of Systems:   Review of Systems   Constitutional:  Negative for chills, fatigue and fever.   HENT:  Negative for congestion and voice change.    Eyes:  Negative for blurred vision.   Respiratory:  Negative for cough, shortness of breath and wheezing.    Cardiovascular:  Negative for chest pain.   Skin:  Negative for dry skin.   Hematological:  Negative for adenopathy.   Psychiatric/Behavioral:  Negative for agitation and depressed mood.        Past Medical History:   Past Medical History:   Diagnosis Date    Heart problem     Hyperlipidemia     Hypertension        Past Surgical History: History reviewed. No pertinent surgical history.    Family History:   Family History   Problem Relation Age of Onset    Hypertension Mother     Hyperlipidemia Mother     Arthritis Mother     Heart attack Mother        Social History:   Social History     Socioeconomic History    Marital status:    Tobacco Use    Smoking status: Every Day     Current packs/day: 1.00     Average packs/day: 1 pack/day for 50.0 years (50.0 ttl  "pk-yrs)     Types: Cigarettes    Smokeless tobacco: Never   Vaping Use    Vaping status: Never Used   Substance and Sexual Activity    Alcohol use: Never    Drug use: Never    Sexual activity: Defer       Medications:     Current Outpatient Medications:     aspirin 81 MG EC tablet, Take 1 tablet by mouth Daily., Disp: , Rfl:     carvedilol (COREG) 12.5 MG tablet, Take 1 tablet by mouth 2 (Two) Times a Day With Meals., Disp: , Rfl:     clopidogrel (PLAVIX) 75 MG tablet, Take 1 tablet by mouth Daily., Disp: , Rfl:     Entresto 49-51 MG tablet, Take 1 tablet by mouth 2 (Two) Times a Day., Disp: , Rfl:     gabapentin (NEURONTIN) 800 MG tablet, TAKE ONE TABLET BY MOUTH FOUR TIMES A DAY, Disp: 120 tablet, Rfl: 5    simvastatin (ZOCOR) 40 MG tablet, Take 1 tablet by mouth Daily., Disp: , Rfl:   No current facility-administered medications for this visit.    Allergies:   Allergies   Allergen Reactions    Hydrocodone-Acetaminophen Other (See Comments)    Sotagliflozin Unknown - Low Severity    Spironolactone Unknown - Low Severity    Hydrocodone Rash     Rash and itch       Physical Exam:  Vital Signs:   Vitals:    06/10/25 1332   BP: 168/88   Pulse: 86   SpO2: 96%   Weight: 95.7 kg (211 lb)   Height: 175.3 cm (69\")       Physical Exam  Vitals and nursing note reviewed.   Constitutional:       General: He is not in acute distress.     Appearance: He is well-developed and normal weight. He is not ill-appearing or toxic-appearing.   Cardiovascular:      Rate and Rhythm: Normal rate and regular rhythm.      Pulses: Normal pulses.      Heart sounds: Normal heart sounds. No murmur heard.     No gallop.   Pulmonary:      Effort: Pulmonary effort is normal.      Breath sounds: Normal breath sounds. No wheezing, rhonchi or rales.   Musculoskeletal:      Right lower leg: No edema.      Left lower leg: No edema.   Neurological:      Mental Status: He is alert.         Immunization History   Administered Date(s) Administered    Fluzone "  >6mos 12/12/2016, 10/22/2024    Fluzone (or Fluarix & Flulaval for VFC) >6mos 09/22/2023, 09/28/2023       Results Review:   - I personally reviewed the pts imaging from CT scan of the chest from 5/29/2025 showed a left upper lobe lung mass, this is new compared to 2023.  - I personally reviewed the pts PFT from 6/10/2025 shows mild obstruction without restriction and a normal DLCO  - I personally reviewed the pts chart with regards to evaluation by the patient's PCP    Assessment / Plan:   Diagnoses and all orders for this visit:    1. Lung mass (Primary)  2. Personal history of smoking  - Patient has a high risk for malignancy given his smoking status.  There is a large left upper lobe mass.  I do recommend further evaluation with bronchoscopy and EBUS.  -I discussed the risk and benefits of the procedure with the patient, this includes but is not limited to hoarseness/vocal cord damage, pain, infection, cough, bleeding, pneumothorax, and anesthesia complications.  Patient verbalized understanding of this and agreed to proceed.  -Robotic CT scan ordered  - EKG, CBC, CMP, and coags ordered  - Highly recommend tobacco cessation, patient is not ready quit    High level of risk due to:  Lung mass highly concerning for malignancy in the left upper lobe requiring invasive procedure for further evaluation.  Would have a high risk of morbidity and mortality.      Follow Up:   Return in about 6 weeks (around 7/22/2025).     INNA Harding,   Pulmonary and Critical Care Medicine  Note Electronically Signed    Part of this note may be an electronic transcription/translation of spoken language to printed text using the Dragon Dictation System.

## 2025-06-11 ENCOUNTER — PREP FOR SURGERY (OUTPATIENT)
Dept: OTHER | Facility: HOSPITAL | Age: 60
End: 2025-06-11
Payer: COMMERCIAL

## 2025-06-11 DIAGNOSIS — R91.1 LUNG NODULE: Primary | ICD-10-CM

## 2025-06-11 RX ORDER — SODIUM CHLORIDE 0.9 % (FLUSH) 0.9 %
10 SYRINGE (ML) INJECTION EVERY 12 HOURS SCHEDULED
OUTPATIENT
Start: 2025-06-11

## 2025-06-11 RX ORDER — LIDOCAINE HYDROCHLORIDE 40 MG/ML
4 INJECTION, SOLUTION RETROBULBAR; TOPICAL ONCE
OUTPATIENT
Start: 2025-06-11 | End: 2025-06-11

## 2025-06-11 RX ORDER — SODIUM CHLORIDE 0.9 % (FLUSH) 0.9 %
10 SYRINGE (ML) INJECTION AS NEEDED
OUTPATIENT
Start: 2025-06-11

## 2025-06-19 ENCOUNTER — DOCUMENTATION (OUTPATIENT)
Dept: PULMONOLOGY | Facility: CLINIC | Age: 60
End: 2025-06-19
Payer: COMMERCIAL

## 2025-06-19 NOTE — PROGRESS NOTES
Called [facility] to schedule P2P for denied CT chest as ordered by Dr. Harding.     First available P2P slot is Wed Jun 25th. Scheduled for 1330.     Discussed with referral coordinator in order to reschedule CT and inform patient.

## 2025-06-27 ENCOUNTER — HOSPITAL ENCOUNTER (OUTPATIENT)
Dept: CT IMAGING | Facility: HOSPITAL | Age: 60
Discharge: HOME | End: 2025-06-27
Payer: COMMERCIAL

## 2025-06-27 DIAGNOSIS — R91.1 LUNG NODULE: ICD-10-CM

## 2025-06-27 PROCEDURE — 71250 CT THORAX DX C-: CPT

## 2025-07-02 NOTE — PROGRESS NOTES
I called the patient and discussed the CT scan with him and his wife.  At this point in time he still would prefer to not have the procedure performed.  I did discuss the risk of that and the high concern that this could be malignant.  I did inform her that my personal opinion is to have this evaluated with bronchoscopy and EBUS.  Not only to rule out cancer but to look for other potential causes of the enlarging left upper lobe mass.  He is still questioning for a second opinion.  I did say it is fine from my standpoint if he would like to have a second opinion.  They asked about a PET scan.  In my opinion I will also only delay potential treatment options and I would not recommend that given the growth rate and size of the mass.    Electronically signed by Juan Manuel Harding DO, 07/02/25, 10:04 AM EDT.

## 2025-07-07 ENCOUNTER — TELEPHONE (OUTPATIENT)
Dept: FAMILY MEDICINE CLINIC | Facility: CLINIC | Age: 60
End: 2025-07-07
Payer: COMMERCIAL

## 2025-07-07 NOTE — TELEPHONE ENCOUNTER
PATIENT WIFE STOPPED BY TO SEE IF PATIENT HANDICAP PLACARD PAPERWORK WAS READY FOR . PLEASE CALL WHEN READY